# Patient Record
Sex: MALE | Race: BLACK OR AFRICAN AMERICAN | NOT HISPANIC OR LATINO | URBAN - METROPOLITAN AREA
[De-identification: names, ages, dates, MRNs, and addresses within clinical notes are randomized per-mention and may not be internally consistent; named-entity substitution may affect disease eponyms.]

---

## 2018-05-16 ENCOUNTER — EMERGENCY (EMERGENCY)
Facility: HOSPITAL | Age: 63
LOS: 1 days | Discharge: HOME | End: 2018-05-16
Attending: EMERGENCY MEDICINE | Admitting: EMERGENCY MEDICINE
Payer: MEDICAID

## 2018-05-16 VITALS
TEMPERATURE: 98 F | OXYGEN SATURATION: 97 % | RESPIRATION RATE: 18 BRPM | DIASTOLIC BLOOD PRESSURE: 71 MMHG | HEART RATE: 82 BPM | SYSTOLIC BLOOD PRESSURE: 125 MMHG

## 2018-05-16 DIAGNOSIS — R41.82 ALTERED MENTAL STATUS, UNSPECIFIED: ICD-10-CM

## 2018-05-16 DIAGNOSIS — Z79.899 OTHER LONG TERM (CURRENT) DRUG THERAPY: ICD-10-CM

## 2018-05-16 DIAGNOSIS — F10.129 ALCOHOL ABUSE WITH INTOXICATION, UNSPECIFIED: ICD-10-CM

## 2018-05-16 PROCEDURE — 99284 EMERGENCY DEPT VISIT MOD MDM: CPT

## 2018-05-16 PROCEDURE — 99285 EMERGENCY DEPT VISIT HI MDM: CPT

## 2018-05-16 NOTE — ED PROVIDER NOTE - ATTENDING CONTRIBUTION TO CARE
63 yom bibems for intox, admits to EtOH use, no trauma noted.  limited history 2/2 mental status.    agree w/ PA, clinically intoxicated, no trauma noted on exam, protecting airway, will assess for sobriety.

## 2018-05-16 NOTE — ED ADULT TRIAGE NOTE - CHIEF COMPLAINT QUOTE
PT FOUND ON the sidewalk sleeping ( [place] [address] Pt +AOB . no obvious head trauma/ injury. PT FOUND ON the sidewalk sleeping ( [place] [address] ). Pt +AOB . no obvious head trauma/ injury. FS 87 mg/ dl on the scene.  cc/o started by EMS

## 2018-05-16 NOTE — ED ADULT NURSE NOTE - OBJECTIVE STATEMENT
receive pt on the stretcher due to alcohol intake. pt arousable to painful stimuli, chest rubbing. no visisble injury noted. IV line placed by EMS, IVF NS in progress.

## 2018-05-16 NOTE — ED ADULT NURSE NOTE - CHIEF COMPLAINT QUOTE
PT FOUND ON the sidewalk sleeping ( [place] [address] ). Pt +AOB . no obvious head trauma/ injury. FS 87 mg/ dl on the scene.  cc/o started by EMS

## 2021-08-22 NOTE — ED PROVIDER NOTE - CARE PLAN
Pt ambulatory to triage. Pt states he has hx of migraines and has HA x 4 days with photophobia. Pt states had cough x 3 days but went away last night. States chills and felt hot but went away last night. Denies loss of taste or smell. Pt states eye doctor told him something growing behind one of his eyes but not sure which one and not sure what it is. Denies known exp to COVID or exp to anyone with similar s/sx. Pt states HA goes away when standing, increases when lying.      Refuses covid test Principal Discharge DX:	Alcohol abuse
